# Patient Record
Sex: MALE | Race: BLACK OR AFRICAN AMERICAN | NOT HISPANIC OR LATINO | Employment: FULL TIME | URBAN - METROPOLITAN AREA
[De-identification: names, ages, dates, MRNs, and addresses within clinical notes are randomized per-mention and may not be internally consistent; named-entity substitution may affect disease eponyms.]

---

## 2018-08-31 ENCOUNTER — HOSPITAL ENCOUNTER (EMERGENCY)
Facility: HOSPITAL | Age: 38
Discharge: HOME | End: 2018-08-31
Attending: EMERGENCY MEDICINE | Admitting: EMERGENCY MEDICINE

## 2018-08-31 ENCOUNTER — APPOINTMENT (OUTPATIENT)
Dept: GENERAL RADIOLOGY | Facility: HOSPITAL | Age: 38
End: 2018-08-31

## 2018-08-31 VITALS
DIASTOLIC BLOOD PRESSURE: 60 MMHG | TEMPERATURE: 98.2 F | SYSTOLIC BLOOD PRESSURE: 117 MMHG | BODY MASS INDEX: 22.2 KG/M2 | WEIGHT: 130 LBS | RESPIRATION RATE: 16 BRPM | HEIGHT: 64 IN | OXYGEN SATURATION: 100 % | HEART RATE: 58 BPM

## 2018-08-31 DIAGNOSIS — S20.211A CONTUSION OF RIB ON RIGHT SIDE, INITIAL ENCOUNTER: Primary | ICD-10-CM
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
## 2018-08-31 LAB
BILIRUB UR QL STRIP: NEGATIVE
CLARITY UR: CLEAR
COLOR UR: YELLOW
GLUCOSE UR STRIP-MCNC: NEGATIVE MG/DL
HGB UR QL STRIP.AUTO: NEGATIVE
KETONES UR QL STRIP: NEGATIVE
LEUKOCYTE ESTERASE UR QL STRIP.AUTO: NEGATIVE
NITRITE UR QL STRIP: NEGATIVE
PH UR STRIP.AUTO: 5.5 [PH] (ref 5–8)
PROT UR QL STRIP: NEGATIVE
SP GR UR STRIP: 1.02 (ref 1–1.03)
UROBILINOGEN UR QL STRIP: NORMAL

## 2018-08-31 PROCEDURE — 99284 EMERGENCY DEPT VISIT MOD MDM: CPT

## 2018-08-31 PROCEDURE — 81003 URINALYSIS AUTO W/O SCOPE: CPT | Performed by: PHYSICIAN ASSISTANT

## 2018-08-31 PROCEDURE — 71101 X-RAY EXAM UNILAT RIBS/CHEST: CPT

## 2018-08-31 RX ORDER — ACETAMINOPHEN 325 MG/1
650 TABLET ORAL ONCE
Status: COMPLETED | OUTPATIENT
Start: 2018-08-31 | End: 2018-08-31

## 2018-08-31 RX ADMIN — ACETAMINOPHEN 650 MG: 325 TABLET, FILM COATED ORAL at 21:59

## 2018-09-01 NOTE — ED PROVIDER NOTES
Subjective   Mr. Lewis Al is a 38 y.o. male who presents to the ED with c/o back pain. He reports that he was at work at [employer] lifting boxes around 1345 today when the box he was lifting hit another one, which caused him to stumble backwards and hit his right back on the corner of a box. He currently complains of right sided back pain, which is worse with movement but is not worse with breathing. He denies hematuria, abdominal pain, nausea, vomiting, diarrhea, and constipation. He has not taken anything for his pain. No other acute complaints at this time.        History provided by:  Patient  Back Pain   Location:  Thoracic spine  Pain severity:  Moderate  Onset quality:  Sudden  Duration:  8 hours  Timing:  Constant  Progression:  Unchanged  Chronicity:  New  Worsened by:  Movement  Associated symptoms: no abdominal pain        Review of Systems   Gastrointestinal: Negative for abdominal pain, constipation, diarrhea, nausea and vomiting.   Genitourinary: Negative for hematuria.   Musculoskeletal: Positive for back pain.   All other systems reviewed and are negative.      History reviewed. No pertinent past medical history.    No Known Allergies    History reviewed. No pertinent surgical history.    History reviewed. No pertinent family history.    Social History     Social History   • Marital status:      Social History Main Topics   • Drug use: Unknown     Other Topics Concern   • Not on file         Objective   Physical Exam   Constitutional: He is oriented to person, place, and time. He appears well-developed and well-nourished. No distress.   HENT:   Head: Normocephalic and atraumatic.   Nose: Nose normal.   Eyes: Conjunctivae are normal. No scleral icterus.   Neck: Normal range of motion. Neck supple.   Cardiovascular: Normal rate, regular rhythm and normal heart sounds.    No murmur heard.  Pulmonary/Chest: Effort normal and breath sounds normal. No respiratory distress.   Abdominal:  Soft. There is no tenderness. There is CVA tenderness.   No abdominal TTP. Mild right CVA TTP.   Musculoskeletal: Normal range of motion. He exhibits tenderness.        Right shoulder: He exhibits no swelling.   Patient has TTP of his right posterior ribcage with no obvious soft tissue swelling or signs of trauma. I cannot palpate a fracture or crepitus. No TTP to T of L spine.   Neurological: He is alert and oriented to person, place, and time.   Skin: Skin is warm and dry. He is not diaphoretic.   Psychiatric: He has a normal mood and affect. His behavior is normal.   Nursing note and vitals reviewed.      Procedures         ED Course  ED Course as of Aug 31 2313   Fri Aug 31, 2018   0404 Radiologic studies showed no acute abnormality to the right rib cage.  Lungs are also unremarkable.  There was no abdominal tenderness.  There was also no evidence of hematuria on urinalysis.  History and exam is most consistent with contusion to the right posterior rib cage.  Vital signs are stable including O2 sat 100% on room air.  Recommend Tylenol/ibuprofen every 4-6 hours as needed for pain.  Patient is stable to return to work tomorrow.  [FC]      ED Course User Index  [FC] Kanika Bardales, JEFFY       Recent Results (from the past 24 hour(s))   Urinalysis With Microscopic If Indicated (No Culture) - Urine, Clean Catch    Collection Time: 08/31/18 10:32 PM   Result Value Ref Range    Color, UA Yellow Yellow, Straw    Appearance, UA Clear Clear    pH, UA 5.5 5.0 - 8.0    Specific Gravity, UA 1.021 1.001 - 1.030    Glucose, UA Negative Negative    Ketones, UA Negative Negative    Bilirubin, UA Negative Negative    Blood, UA Negative Negative    Protein, UA Negative Negative    Leuk Esterase, UA Negative Negative    Nitrite, UA Negative Negative    Urobilinogen, UA 0.2 E.U./dL 0.2 - 1.0 E.U./dL     Note: In addition to lab results from this visit, the labs listed above may include labs taken at another facility or during a  "different encounter within the last 24 hours. Please correlate lab times with ED admission and discharge times for further clarification of the services performed during this visit.    XR Ribs Right With PA Chest   Final Result   No acute displaced fracture. No acute findings.      THIS DOCUMENT HAS BEEN ELECTRONICALLY SIGNED BY DEWEY CHU MD        Vitals:    08/31/18 1926 08/31/18 2303   BP: 129/82 117/60   BP Location: Left arm Left arm   Patient Position: Sitting Sitting   Pulse: 76 58   Resp: 16    Temp: 98.2 °F (36.8 °C)    TempSrc: Oral    SpO2: 100% 100%   Weight: 59 kg (130 lb)    Height: 162.6 cm (64\")      Medications   acetaminophen (TYLENOL) tablet 650 mg (650 mg Oral Given 8/31/18 2159)     ECG/EMG Results (last 24 hours)     ** No results found for the last 24 hours. **                      [facility]    Final diagnoses:   Contusion of rib on right side, initial encounter   Work related injury       Documentation assistance provided by felicitas Love.  Information recorded by the scribramiro was done at my direction and has been verified and validated by me.     Marcia Love  08/31/18 2153       Marcia Love  08/31/18 2255       Kanika Bardales PA-C  08/31/18 2319    "

## 2018-09-01 NOTE — DISCHARGE INSTRUCTIONS
X-rays of the chest and right ribs showed no acute abnormality.  Encourage deep breathing and ice as needed.  Urinalysis revealed no evidence of blood.  Rx for diclofenac 50 mg by mouth 3 times a day as needed for pain/inflammation.  Patient may also alternate Tylenol every 4-6 hours for breakthrough pain.      Return to the ER if you develop any acute or worsening symptoms.     Follow up with [facility] on Tuesday if pain persists or return if worsening symptoms.